# Patient Record
Sex: MALE | URBAN - METROPOLITAN AREA
[De-identification: names, ages, dates, MRNs, and addresses within clinical notes are randomized per-mention and may not be internally consistent; named-entity substitution may affect disease eponyms.]

---

## 2017-05-19 ENCOUNTER — INPATIENT (INPATIENT)
Facility: HOSPITAL | Age: 32
LOS: 1 days | Discharge: HOME | DRG: 683 | End: 2017-05-21
Attending: HOSPITALIST | Admitting: HOSPITALIST
Payer: MEDICAID

## 2017-05-19 VITALS
WEIGHT: 300.05 LBS | DIASTOLIC BLOOD PRESSURE: 56 MMHG | TEMPERATURE: 99 F | SYSTOLIC BLOOD PRESSURE: 119 MMHG | HEIGHT: 69 IN | HEART RATE: 130 BPM | RESPIRATION RATE: 28 BRPM

## 2017-05-19 DIAGNOSIS — R07.9 CHEST PAIN, UNSPECIFIED: ICD-10-CM

## 2017-05-19 DIAGNOSIS — Z87.2 PERSONAL HISTORY OF DISEASES OF THE SKIN AND SUBCUTANEOUS TISSUE: Chronic | ICD-10-CM

## 2017-05-19 DIAGNOSIS — Z98.890 OTHER SPECIFIED POSTPROCEDURAL STATES: Chronic | ICD-10-CM

## 2017-05-19 DIAGNOSIS — M79.7 FIBROMYALGIA: ICD-10-CM

## 2017-05-19 DIAGNOSIS — Z90.49 ACQUIRED ABSENCE OF OTHER SPECIFIED PARTS OF DIGESTIVE TRACT: Chronic | ICD-10-CM

## 2017-05-19 DIAGNOSIS — I10 ESSENTIAL (PRIMARY) HYPERTENSION: ICD-10-CM

## 2017-05-19 DIAGNOSIS — J45.909 UNSPECIFIED ASTHMA, UNCOMPLICATED: ICD-10-CM

## 2017-05-19 DIAGNOSIS — N17.9 ACUTE KIDNEY FAILURE, UNSPECIFIED: ICD-10-CM

## 2017-05-19 LAB
ALBUMIN SERPL ELPH-MCNC: 5.3 G/DL — HIGH (ref 3.3–5)
ALP SERPL-CCNC: 86 U/L — SIGNIFICANT CHANGE UP (ref 30–120)
ALT FLD-CCNC: 55 U/L DA — SIGNIFICANT CHANGE UP (ref 10–60)
ANION GAP SERPL CALC-SCNC: 16 MMOL/L — SIGNIFICANT CHANGE UP (ref 5–17)
APPEARANCE UR: CLEAR — SIGNIFICANT CHANGE UP
AST SERPL-CCNC: 33 U/L — SIGNIFICANT CHANGE UP (ref 10–40)
BASOPHILS # BLD AUTO: 0.1 K/UL — SIGNIFICANT CHANGE UP (ref 0–0.2)
BASOPHILS NFR BLD AUTO: 0.5 % — SIGNIFICANT CHANGE UP (ref 0–2)
BILIRUB SERPL-MCNC: 1 MG/DL — SIGNIFICANT CHANGE UP (ref 0.2–1.2)
BILIRUB UR-MCNC: NEGATIVE — SIGNIFICANT CHANGE UP
BUN SERPL-MCNC: 35 MG/DL — HIGH (ref 7–23)
CALCIUM SERPL-MCNC: 10.9 MG/DL — HIGH (ref 8.4–10.5)
CHLORIDE SERPL-SCNC: 91 MMOL/L — LOW (ref 96–108)
CK MB BLD-MCNC: 0.3 % — SIGNIFICANT CHANGE UP (ref 0–3.5)
CK MB CFR SERPL CALC: 2 NG/ML — SIGNIFICANT CHANGE UP (ref 0–3.6)
CK SERPL-CCNC: 607 U/L — HIGH (ref 39–308)
CO2 SERPL-SCNC: 25 MMOL/L — SIGNIFICANT CHANGE UP (ref 22–31)
COLOR SPEC: YELLOW — SIGNIFICANT CHANGE UP
CREAT SERPL-MCNC: 2.56 MG/DL — HIGH (ref 0.5–1.3)
D DIMER BLD IA.RAPID-MCNC: 205 NG/ML DDU — SIGNIFICANT CHANGE UP
DIFF PNL FLD: ABNORMAL
EOSINOPHIL # BLD AUTO: 0.3 K/UL — SIGNIFICANT CHANGE UP (ref 0–0.5)
EOSINOPHIL NFR BLD AUTO: 1.5 % — SIGNIFICANT CHANGE UP (ref 0–6)
GLUCOSE SERPL-MCNC: 132 MG/DL — HIGH (ref 70–99)
GLUCOSE UR QL: NEGATIVE MG/DL — SIGNIFICANT CHANGE UP
HCT VFR BLD CALC: 54.9 % — HIGH (ref 39–50)
HGB BLD-MCNC: 18.8 G/DL — HIGH (ref 13–17)
KETONES UR-MCNC: ABNORMAL
LACTATE SERPL-SCNC: 1.1 MMOL/L — SIGNIFICANT CHANGE UP (ref 0.7–2)
LACTATE SERPL-SCNC: 3.1 MMOL/L — HIGH (ref 0.7–2)
LEUKOCYTE ESTERASE UR-ACNC: NEGATIVE — SIGNIFICANT CHANGE UP
LIDOCAIN IGE QN: 273 U/L — SIGNIFICANT CHANGE UP (ref 73–393)
LYMPHOCYTES # BLD AUTO: 17.9 % — SIGNIFICANT CHANGE UP (ref 13–44)
LYMPHOCYTES # BLD AUTO: 3.2 K/UL — SIGNIFICANT CHANGE UP (ref 1–3.3)
MCHC RBC-ENTMCNC: 28.2 PG — SIGNIFICANT CHANGE UP (ref 27–34)
MCHC RBC-ENTMCNC: 34.3 GM/DL — SIGNIFICANT CHANGE UP (ref 32–36)
MCV RBC AUTO: 82.2 FL — SIGNIFICANT CHANGE UP (ref 80–100)
MONOCYTES # BLD AUTO: 1 K/UL — HIGH (ref 0–0.9)
MONOCYTES NFR BLD AUTO: 5.8 % — SIGNIFICANT CHANGE UP (ref 2–14)
NEUTROPHILS # BLD AUTO: 13.1 K/UL — HIGH (ref 1.8–7.4)
NEUTROPHILS NFR BLD AUTO: 74.2 % — SIGNIFICANT CHANGE UP (ref 43–77)
NITRITE UR-MCNC: NEGATIVE — SIGNIFICANT CHANGE UP
PCP SPEC-MCNC: SIGNIFICANT CHANGE UP
PH UR: 5 — SIGNIFICANT CHANGE UP (ref 5–8)
PLATELET # BLD AUTO: 547 K/UL — HIGH (ref 150–400)
POTASSIUM SERPL-MCNC: 3.4 MMOL/L — LOW (ref 3.5–5.3)
POTASSIUM SERPL-SCNC: 3.4 MMOL/L — LOW (ref 3.5–5.3)
PROT SERPL-MCNC: 9.6 G/DL — HIGH (ref 6–8.3)
PROT UR-MCNC: 30 MG/DL
RBC # BLD: 6.68 M/UL — HIGH (ref 4.2–5.8)
RBC # FLD: 13.4 % — SIGNIFICANT CHANGE UP (ref 10.3–14.5)
SODIUM SERPL-SCNC: 132 MMOL/L — LOW (ref 135–145)
SP GR SPEC: 1.01 — SIGNIFICANT CHANGE UP (ref 1.01–1.02)
TROPONIN I SERPL-MCNC: 0 NG/ML — LOW (ref 0.02–0.06)
UROBILINOGEN FLD QL: NEGATIVE MG/DL — SIGNIFICANT CHANGE UP
WBC # BLD: 17.6 K/UL — HIGH (ref 3.8–10.5)
WBC # FLD AUTO: 17.6 K/UL — HIGH (ref 3.8–10.5)

## 2017-05-19 PROCEDURE — 99223 1ST HOSP IP/OBS HIGH 75: CPT | Mod: AI

## 2017-05-19 PROCEDURE — 74176 CT ABD & PELVIS W/O CONTRAST: CPT | Mod: 26

## 2017-05-19 PROCEDURE — 99285 EMERGENCY DEPT VISIT HI MDM: CPT

## 2017-05-19 PROCEDURE — 76775 US EXAM ABDO BACK WALL LIM: CPT | Mod: 26

## 2017-05-19 PROCEDURE — 93010 ELECTROCARDIOGRAM REPORT: CPT | Mod: 77

## 2017-05-19 PROCEDURE — 71010: CPT | Mod: 26

## 2017-05-19 PROCEDURE — 93010 ELECTROCARDIOGRAM REPORT: CPT

## 2017-05-19 RX ORDER — HEPARIN SODIUM 5000 [USP'U]/ML
5000 INJECTION INTRAVENOUS; SUBCUTANEOUS EVERY 8 HOURS
Qty: 0 | Refills: 0 | Status: DISCONTINUED | OUTPATIENT
Start: 2017-05-19 | End: 2017-05-21

## 2017-05-19 RX ORDER — CLONAZEPAM 1 MG
1 TABLET ORAL
Qty: 0 | Refills: 0 | Status: DISCONTINUED | OUTPATIENT
Start: 2017-05-19 | End: 2017-05-21

## 2017-05-19 RX ORDER — PIPERACILLIN AND TAZOBACTAM 4; .5 G/20ML; G/20ML
3.38 INJECTION, POWDER, LYOPHILIZED, FOR SOLUTION INTRAVENOUS ONCE
Qty: 0 | Refills: 0 | Status: COMPLETED | OUTPATIENT
Start: 2017-05-19 | End: 2017-05-19

## 2017-05-19 RX ORDER — ARIPIPRAZOLE 15 MG/1
10 TABLET ORAL DAILY
Qty: 0 | Refills: 0 | Status: DISCONTINUED | OUTPATIENT
Start: 2017-05-19 | End: 2017-05-21

## 2017-05-19 RX ORDER — PANTOPRAZOLE SODIUM 20 MG/1
40 TABLET, DELAYED RELEASE ORAL ONCE
Qty: 0 | Refills: 0 | Status: COMPLETED | OUTPATIENT
Start: 2017-05-19 | End: 2017-05-19

## 2017-05-19 RX ORDER — DULOXETINE HYDROCHLORIDE 30 MG/1
60 CAPSULE, DELAYED RELEASE ORAL DAILY
Qty: 0 | Refills: 0 | Status: DISCONTINUED | OUTPATIENT
Start: 2017-05-19 | End: 2017-05-21

## 2017-05-19 RX ORDER — SODIUM CHLORIDE 9 MG/ML
1000 INJECTION INTRAMUSCULAR; INTRAVENOUS; SUBCUTANEOUS
Qty: 0 | Refills: 0 | Status: DISCONTINUED | OUTPATIENT
Start: 2017-05-19 | End: 2017-05-20

## 2017-05-19 RX ORDER — FOLIC ACID 0.8 MG
1 TABLET ORAL DAILY
Qty: 0 | Refills: 0 | Status: DISCONTINUED | OUTPATIENT
Start: 2017-05-19 | End: 2017-05-21

## 2017-05-19 RX ORDER — SODIUM CHLORIDE 9 MG/ML
1000 INJECTION INTRAMUSCULAR; INTRAVENOUS; SUBCUTANEOUS ONCE
Qty: 0 | Refills: 0 | Status: COMPLETED | OUTPATIENT
Start: 2017-05-19 | End: 2017-05-19

## 2017-05-19 RX ORDER — LEVALBUTEROL 1.25 MG/.5ML
0.63 SOLUTION, CONCENTRATE RESPIRATORY (INHALATION) EVERY 4 HOURS
Qty: 0 | Refills: 0 | Status: DISCONTINUED | OUTPATIENT
Start: 2017-05-19 | End: 2017-05-21

## 2017-05-19 RX ORDER — METOPROLOL TARTRATE 50 MG
5 TABLET ORAL ONCE
Qty: 0 | Refills: 0 | Status: DISCONTINUED | OUTPATIENT
Start: 2017-05-19 | End: 2017-05-19

## 2017-05-19 RX ORDER — SODIUM CHLORIDE 9 MG/ML
3 INJECTION INTRAMUSCULAR; INTRAVENOUS; SUBCUTANEOUS ONCE
Qty: 0 | Refills: 0 | Status: COMPLETED | OUTPATIENT
Start: 2017-05-19 | End: 2017-05-19

## 2017-05-19 RX ORDER — HYDROMORPHONE HYDROCHLORIDE 2 MG/ML
1 INJECTION INTRAMUSCULAR; INTRAVENOUS; SUBCUTANEOUS ONCE
Qty: 0 | Refills: 0 | Status: DISCONTINUED | OUTPATIENT
Start: 2017-05-19 | End: 2017-05-19

## 2017-05-19 RX ADMIN — PANTOPRAZOLE SODIUM 40 MILLIGRAM(S): 20 TABLET, DELAYED RELEASE ORAL at 16:57

## 2017-05-19 RX ADMIN — PIPERACILLIN AND TAZOBACTAM 200 GRAM(S): 4; .5 INJECTION, POWDER, LYOPHILIZED, FOR SOLUTION INTRAVENOUS at 19:25

## 2017-05-19 RX ADMIN — HYDROMORPHONE HYDROCHLORIDE 1 MILLIGRAM(S): 2 INJECTION INTRAMUSCULAR; INTRAVENOUS; SUBCUTANEOUS at 20:00

## 2017-05-19 RX ADMIN — HYDROMORPHONE HYDROCHLORIDE 1 MILLIGRAM(S): 2 INJECTION INTRAMUSCULAR; INTRAVENOUS; SUBCUTANEOUS at 19:25

## 2017-05-19 RX ADMIN — SODIUM CHLORIDE 2000 MILLILITER(S): 9 INJECTION INTRAMUSCULAR; INTRAVENOUS; SUBCUTANEOUS at 16:58

## 2017-05-19 RX ADMIN — SODIUM CHLORIDE 3 MILLILITER(S): 9 INJECTION INTRAMUSCULAR; INTRAVENOUS; SUBCUTANEOUS at 16:27

## 2017-05-19 RX ADMIN — Medication 1 MILLIGRAM(S): at 19:26

## 2017-05-19 RX ADMIN — SODIUM CHLORIDE 2000 MILLILITER(S): 9 INJECTION INTRAMUSCULAR; INTRAVENOUS; SUBCUTANEOUS at 16:28

## 2017-05-19 RX ADMIN — SODIUM CHLORIDE 100 MILLILITER(S): 9 INJECTION INTRAMUSCULAR; INTRAVENOUS; SUBCUTANEOUS at 22:54

## 2017-05-19 RX ADMIN — Medication 2 MILLIGRAM(S): at 15:51

## 2017-05-19 NOTE — ED ADULT NURSE NOTE - NS ED NURSE LEVEL OF CONSCIOUSNESS ORIENTATION
Patient Demographics     Address Phone E-mail Address    [address] [phone] (Home) *Preferred* [email or web address]      Emergency Contact(s)     Name Relation Home Work [address] [facility] Spouse 21  [place], [facility] Oriented - self; Oriented - place; Oriented - time

## 2017-05-19 NOTE — H&P ADULT - PMH
Depression    Essential hypertension    Fibromyalgia    Irritable bowel syndrome, unspecified type    Uncomplicated asthma, unspecified asthma severity

## 2017-05-19 NOTE — H&P ADULT - HISTORY OF PRESENT ILLNESS
32M with fibromyalgia, HTN, depression, asthma, IBS who presents with diffuse muscle spasms.  Patient states his spams feels similar to his fibromyalgia but much worse.  He has been experiencing spasms throughout his body for the past three days.  In addition, he started to experience chest pain, right-side worse than his left.  Pain is described as intermittent but each episode has been lasting longer and longer.  Also associated with +SOB, +cough, +nausea/vomiting (no blood), +diarrhea (no blood), +decreased PO intake, +HA/dizziness (for the past 2 days).  Patient denies any recent travel or sick contacts.  No recent medication changes except he has been taking Prilosec instead of his other GI medication.  Also has been taking his inhaler (xopenex) more frequently because of his SOB.  Also smoked marijuana about 4 days ago though he said the marijuana was from the same batch he has smoked before with no adverse reactions.      In the ED, patient was noted to be diaphoretic with diffuse muscle spams/tightening.  Also noted to be tachycardic and was eventually given lopressor IV.  Labs were notable for a lactic acid of 3.1, YURY 2.56, WBC 17.6, hgb 18.8, and .  D-dimer was negative.  EKG showed sinus tach with incomplete RBBB, RAD, poor R wave progression.  CT A/P showed no acute processes.  Currently, patient feels better with no muscle spams/pain.  HR improved to 100-110s. 32M with fibromyalgia, HTN, depression, asthma, IBS who presents with diffuse muscle spasms.  Patient states his spams feels similar to his fibromyalgia but much worse.  He has been experiencing spasms throughout his body for the past three days.  In addition, he started to experience chest pain, right-side worse than his left.  Pain is described as intermittent but each episode has been lasting longer and longer.  Also associated with +SOB, +cough, +nausea/vomiting (no blood), +diarrhea (no blood), +decreased PO intake, +HA/dizziness (for the past 2 days).  Admits to some recent weight loss as well.  Patient denies any recent travel or sick contacts.  No recent medication changes except he has been taking Prilosec instead of his other GI medication.  Also has been taking his inhaler (xopenex) more frequently because of his SOB.  Also smoked marijuana about 4 days ago though he said the marijuana was from the same batch he has smoked before with no adverse reactions.      In the ED, patient was noted to be diaphoretic with diffuse muscle spams/tightening.  Also noted to be tachycardic and was eventually given lopressor IV.  Labs were notable for a lactic acid of 3.1, YURY 2.56, WBC 17.6, hgb 18.8, and .  D-dimer was negative.  EKG showed sinus tach with incomplete RBBB, RAD, poor R wave progression.  CT A/P showed no acute processes.  Currently, patient feels better with no muscle spams/pain.  HR improved to 100-110s.

## 2017-05-19 NOTE — H&P ADULT - NSHPPHYSICALEXAM_GEN_ALL_CORE
PHYSICAL EXAM:  Vital Signs Last 24 Hrs  T(C): 36.6, Max: 37 (05-19 @ 15:27)  T(F): 97.9, Max: 98.6 (05-19 @ 15:27)  HR: 113 (113 - 130)  BP: 121/73 (113/56 - 131/79)  BP(mean): --  RR: 18 (18 - 28)  SpO2: 99% (98% - 99%)    GENERAL: obese male NAD, well-groomed, well-developed  HEAD:  atraumatic, normocephalic  EYES: EOMI, conjunctiva and sclera clear  ENMT: No tonsillar erythema, exudates, or enlargement; dry mucous membranes, good dentition, No lesions  NECK: supple, no JVD  NERVOUS SYSTEM:  alert & oriented X3, good concentration; motor Strength 5/5 B/L upper and lower extremities  CHEST/LUNG: clear to auscultation bilaterally; No rales, rhonchi, wheezing, or rubs  HEART: tachycardic, soft s1 and s2; no murmurs, rubs, or gallops  ABDOMEN: soft, slight tender diffusely, protuberant but nondistended; bowel sounds present  EXTREMITIES:  2+ peripheral pulses, no clubbing, cyanosis, or edema

## 2017-05-19 NOTE — H&P ADULT - NSHPSOCIALHISTORY_GEN_ALL_CORE
- denies smoking or etoh use  + smokes - denies smoking or etoh use  + smokes marijuana for his fibromyalgia but denies any other illegal drug use

## 2017-05-19 NOTE — ED PROVIDER NOTE - NS ED MD SCRIBE ATTENDING SCRIBE SECTIONS
PHYSICAL EXAM/PAST MEDICAL/SURGICAL/SOCIAL HISTORY/VITAL SIGNS( Pullset)/HIV/HISTORY OF PRESENT ILLNESS/REVIEW OF SYSTEMS/DISPOSITION

## 2017-05-19 NOTE — ED PROVIDER NOTE - OBJECTIVE STATEMENT
33 y/o M pt with PMHx of fibromyalgia, muscle spasm, depression, hypertension, and IBS presents to the ED c/o chest pain, onset 3 days ago. Pt describes the pain as muscles spasms in his chest. As per father, his pain has been worsening and is constant. Pt reports he smokes marijuana, but the last time was 3 days ago. Also c/o dizziness, lightheadedness, diaphoresis, cough and emesis, secondary to cough, onset yesterday. Denies black stool or fever. No other complaints at this time.  PCP: Dr. Menezes

## 2017-05-19 NOTE — H&P ADULT - NSHPLABSRESULTS_GEN_ALL_CORE
LABS:  CBC Full  -  ( 19 May 2017 16:31 )  WBC Count : 17.6 K/uL  Hemoglobin : 18.8 g/dL  Hematocrit : 54.9 %  Platelet Count - Automated : 547 K/uL  Mean Cell Volume : 82.2 fl  Mean Cell Hemoglobin : 28.2 pg  Mean Cell Hemoglobin Concentration : 34.3 gm/dL  Auto Neutrophil # : 13.1 K/uL  Auto Lymphocyte # : 3.2 K/uL  Auto Monocyte # : 1.0 K/uL  Auto Eosinophil # : 0.3 K/uL  Auto Basophil # : 0.1 K/uL  Auto Neutrophil % : 74.2 %  Auto Lymphocyte % : 17.9 %  Auto Monocyte % : 05.8 %  Auto Eosinophil % : 1.5 %  Auto Basophil % : 0.5 %    19 May 2017 16:31    132<L>  |  91<L>  |  35<H>  ----------------------------<  132<H>  3.4<L>   |  25     |  2.56<H>        Ca    10.9<H>      19 May 2017 16:31    TPro  9.6<H>  /  Alb  5.3<H>  /  TBili  1.0    /  DBili  x      /  AST  33     /  ALT  55     /  AlkPhos  86     19 May 2017 16:31      Urinalysis Basic - ( 19 May 2017 18:59 )    Color: Yellow / Appearance: Clear / S.010 / pH: x  Gluc: x / Ketone: Trace  / Bili: Negative / Urobili: Negative mg/dL   Blood: x / Protein: 30 mg/dL / Nitrite: Negative   Leuk Esterase: Negative / RBC: 3-5 /HPF / WBC 0-2   Sq Epi: x / Non Sq Epi: Occasional / Bacteria: Few        Troponin trend:  .002  -19 @ 16:31 LABS:  CBC Full  -  ( 19 May 2017 16:31 )  WBC Count : 17.6 K/uL  Hemoglobin : 18.8 g/dL  Hematocrit : 54.9 %  Platelet Count - Automated : 547 K/uL  Mean Cell Volume : 82.2 fl  Mean Cell Hemoglobin : 28.2 pg  Mean Cell Hemoglobin Concentration : 34.3 gm/dL  Auto Neutrophil # : 13.1 K/uL  Auto Lymphocyte # : 3.2 K/uL  Auto Monocyte # : 1.0 K/uL  Auto Eosinophil # : 0.3 K/uL  Auto Basophil # : 0.1 K/uL  Auto Neutrophil % : 74.2 %  Auto Lymphocyte % : 17.9 %  Auto Monocyte % : 05.8 %  Auto Eosinophil % : 1.5 %  Auto Basophil % : 0.5 %    19 May 2017 16:31    132<L>  |  91<L>  |  35<H>  ----------------------------<  132<H>  3.4<L>   |  25     |  2.56<H>        Ca    10.9<H>      19 May 2017 16:31    TPro  9.6<H>  /  Alb  5.3<H>  /  TBili  1.0    /  DBili  x      /  AST  33     /  ALT  55     /  AlkPhos  86     19 May 2017 16:31      Urinalysis Basic - ( 19 May 2017 18:59 )    Color: Yellow / Appearance: Clear / S.010 / pH: x  Gluc: x / Ketone: Trace  / Bili: Negative / Urobili: Negative mg/dL   Blood: x / Protein: 30 mg/dL / Nitrite: Negative   Leuk Esterase: Negative / RBC: 3-5 /HPF / WBC 0-2   Sq Epi: x / Non Sq Epi: Occasional / Bacteria: Few    Troponin trend:  .002  -19 @ 16:31    EKG (see above)    CT A/P:   FINDINGS:   The lung bases are clear. The heart is not enlarged.    Evaluation of the parenchymal organs and vascular structures is limited   without intravenous contrast. Suboptimal assessment of the bowel without   oral contrast.    The liver, spleen, pancreas, gallbladder and adrenal glands are grossly   unremarkable.    There is no hydronephrosis or perinephric stranding. There is no   obstructing renal calculus.    There is no bowel obstruction, free air or free fluid. There is no   abnormal bowel wall thickening. The appendix is not visualized, likely   previously surgically removed.    The abdominal aorta is normal in caliber. There is no retroperitoneal,   pelvic or inguinal adenopathy.     Theurinary bladder is unremarkable without bladder calculus. The   prostate gland is not enlarged.    There is an osseous meningioma within the T8 vertebral body. There are no   acute osseous abnormalities.    IMPRESSION:   No evidence of infectious source within the abdomen or pelvis allowing   for absence of contrast.

## 2017-05-19 NOTE — H&P ADULT - PROBLEM SELECTOR PLAN 4
- cont abilify and buspar and cymbalta (monitor QTc)  - consider psych consult if no organic causes are found  - cont klonopin

## 2017-05-19 NOTE — ED PROVIDER NOTE - MEDICAL DECISION MAKING DETAILS
Pt is a 33 yo male hx fibromylagia, depression.  pt also with panic attacks on benzos. pt states 3 days ago began with cp, sob, initially spasms, then n/v, no diffuse spasms, diaphoresis.  pain severe. pt tachy.  check cardiac labs, dd, ivf, pain meds, check tox,  check sepsis eval.  reevaulate.

## 2017-05-19 NOTE — H&P ADULT - ASSESSMENT
32M with fibromyalgia, HTN, depression, asthma, IBS who presents with diffuse muscle spasms.  Unclear cause of his symptoms.  Differentials include adverse effect from drug use (marijuana could have been laced with something), psychiatric (i.e. anxiety, panic attacks, somatization, factitious d/o), adverse effect from his medications (catalino his psych meds that can cause EPS/tardive dyskinesia or withdrawal from his klonopin though he takes it regularly), organic causes (i.e. hyperthyroidism, electrolyte imbalances, occult infection.).  Also concerning is that he has YURY.  Unknown baseline but patient said he had blood work from Dr. Menezes a couple of weeks ago with no known issues.  YURY could be caused from dehydration (poor PO intake) or, again, adverse effect from drugs/medications.

## 2017-05-19 NOTE — H&P ADULT - PROBLEM SELECTOR PLAN 1
- admit to telemetry  - trend troponin  - repeat EKG  - lopressor IV PRN tachycardia  - muscle spams resolved for now but can give benadryl to see if it would resolve symptoms (if he has EPS-like symptoms)  - f/u utox  - check TSH  - f/u blood cultures  - hold off abx for now as there is no identifiable source currently

## 2017-05-20 LAB
ALBUMIN SERPL ELPH-MCNC: 3.6 G/DL — SIGNIFICANT CHANGE UP (ref 3.3–5)
ALP SERPL-CCNC: 56 U/L — SIGNIFICANT CHANGE UP (ref 30–120)
ALT FLD-CCNC: 40 U/L DA — SIGNIFICANT CHANGE UP (ref 10–60)
ANION GAP SERPL CALC-SCNC: 9 MMOL/L — SIGNIFICANT CHANGE UP (ref 5–17)
APPEARANCE UR: CLEAR — SIGNIFICANT CHANGE UP
AST SERPL-CCNC: 34 U/L — SIGNIFICANT CHANGE UP (ref 10–40)
BASOPHILS # BLD AUTO: 0.1 K/UL — SIGNIFICANT CHANGE UP (ref 0–0.2)
BASOPHILS NFR BLD AUTO: 1 % — SIGNIFICANT CHANGE UP (ref 0–2)
BILIRUB SERPL-MCNC: 0.7 MG/DL — SIGNIFICANT CHANGE UP (ref 0.2–1.2)
BILIRUB UR-MCNC: NEGATIVE — SIGNIFICANT CHANGE UP
BUN SERPL-MCNC: 26 MG/DL — HIGH (ref 7–23)
CALCIUM SERPL-MCNC: 8.7 MG/DL — SIGNIFICANT CHANGE UP (ref 8.4–10.5)
CHLORIDE SERPL-SCNC: 100 MMOL/L — SIGNIFICANT CHANGE UP (ref 96–108)
CO2 SERPL-SCNC: 26 MMOL/L — SIGNIFICANT CHANGE UP (ref 22–31)
COLOR SPEC: YELLOW — SIGNIFICANT CHANGE UP
CREAT SERPL-MCNC: 1.46 MG/DL — HIGH (ref 0.5–1.3)
CULTURE RESULTS: NO GROWTH — SIGNIFICANT CHANGE UP
DIFF PNL FLD: NEGATIVE — SIGNIFICANT CHANGE UP
EOSINOPHIL # BLD AUTO: 0.3 K/UL — SIGNIFICANT CHANGE UP (ref 0–0.5)
EOSINOPHIL NFR BLD AUTO: 2.8 % — SIGNIFICANT CHANGE UP (ref 0–6)
GLUCOSE SERPL-MCNC: 93 MG/DL — SIGNIFICANT CHANGE UP (ref 70–99)
GLUCOSE UR QL: NEGATIVE MG/DL — SIGNIFICANT CHANGE UP
HCT VFR BLD CALC: 42.5 % — SIGNIFICANT CHANGE UP (ref 39–50)
HGB BLD-MCNC: 14.7 G/DL — SIGNIFICANT CHANGE UP (ref 13–17)
KETONES UR-MCNC: NEGATIVE — SIGNIFICANT CHANGE UP
LEUKOCYTE ESTERASE UR-ACNC: NEGATIVE — SIGNIFICANT CHANGE UP
LYMPHOCYTES # BLD AUTO: 3 K/UL — SIGNIFICANT CHANGE UP (ref 1–3.3)
LYMPHOCYTES # BLD AUTO: 30.2 % — SIGNIFICANT CHANGE UP (ref 13–44)
MAGNESIUM SERPL-MCNC: 2.2 MG/DL — SIGNIFICANT CHANGE UP (ref 1.6–2.6)
MCHC RBC-ENTMCNC: 28.3 PG — SIGNIFICANT CHANGE UP (ref 27–34)
MCHC RBC-ENTMCNC: 34.5 GM/DL — SIGNIFICANT CHANGE UP (ref 32–36)
MCV RBC AUTO: 82 FL — SIGNIFICANT CHANGE UP (ref 80–100)
MONOCYTES # BLD AUTO: 0.9 K/UL — SIGNIFICANT CHANGE UP (ref 0–0.9)
MONOCYTES NFR BLD AUTO: 8.7 % — SIGNIFICANT CHANGE UP (ref 2–14)
NEUTROPHILS # BLD AUTO: 5.7 K/UL — SIGNIFICANT CHANGE UP (ref 1.8–7.4)
NEUTROPHILS NFR BLD AUTO: 57.3 % — SIGNIFICANT CHANGE UP (ref 43–77)
NITRITE UR-MCNC: NEGATIVE — SIGNIFICANT CHANGE UP
PH UR: 5 — SIGNIFICANT CHANGE UP (ref 5–8)
PHOSPHATE SERPL-MCNC: 3.2 MG/DL — SIGNIFICANT CHANGE UP (ref 2.5–4.5)
PLATELET # BLD AUTO: 342 K/UL — SIGNIFICANT CHANGE UP (ref 150–400)
POTASSIUM SERPL-MCNC: 3.5 MMOL/L — SIGNIFICANT CHANGE UP (ref 3.5–5.3)
POTASSIUM SERPL-SCNC: 3.5 MMOL/L — SIGNIFICANT CHANGE UP (ref 3.5–5.3)
PROT SERPL-MCNC: 6.8 G/DL — SIGNIFICANT CHANGE UP (ref 6–8.3)
PROT UR-MCNC: 15 MG/DL
RBC # BLD: 5.18 M/UL — SIGNIFICANT CHANGE UP (ref 4.2–5.8)
RBC # FLD: 13.6 % — SIGNIFICANT CHANGE UP (ref 10.3–14.5)
RBC CASTS # UR COMP ASSIST: ABNORMAL /HPF (ref 0–4)
SODIUM SERPL-SCNC: 135 MMOL/L — SIGNIFICANT CHANGE UP (ref 135–145)
SP GR SPEC: 1.02 — SIGNIFICANT CHANGE UP (ref 1.01–1.02)
SPECIMEN SOURCE: SIGNIFICANT CHANGE UP
TROPONIN I SERPL-MCNC: 0 NG/ML — LOW (ref 0.02–0.06)
TSH SERPL-MCNC: 0.94 UIU/ML — SIGNIFICANT CHANGE UP (ref 0.27–4.2)
UROBILINOGEN FLD QL: NEGATIVE MG/DL — SIGNIFICANT CHANGE UP
WBC # BLD: 9.9 K/UL — SIGNIFICANT CHANGE UP (ref 3.8–10.5)
WBC # FLD AUTO: 9.9 K/UL — SIGNIFICANT CHANGE UP (ref 3.8–10.5)
WBC UR QL: SIGNIFICANT CHANGE UP

## 2017-05-20 PROCEDURE — 99233 SBSQ HOSP IP/OBS HIGH 50: CPT

## 2017-05-20 RX ORDER — DIAZEPAM 5 MG
2 TABLET ORAL ONCE
Qty: 0 | Refills: 0 | Status: DISCONTINUED | OUTPATIENT
Start: 2017-05-20 | End: 2017-05-20

## 2017-05-20 RX ORDER — POTASSIUM CHLORIDE 20 MEQ
20 PACKET (EA) ORAL ONCE
Qty: 0 | Refills: 0 | Status: COMPLETED | OUTPATIENT
Start: 2017-05-20 | End: 2017-05-20

## 2017-05-20 RX ORDER — SODIUM CHLORIDE 9 MG/ML
1000 INJECTION INTRAMUSCULAR; INTRAVENOUS; SUBCUTANEOUS
Qty: 0 | Refills: 0 | Status: DISCONTINUED | OUTPATIENT
Start: 2017-05-20 | End: 2017-05-21

## 2017-05-20 RX ADMIN — HEPARIN SODIUM 5000 UNIT(S): 5000 INJECTION INTRAVENOUS; SUBCUTANEOUS at 14:13

## 2017-05-20 RX ADMIN — Medication 1 MILLIGRAM(S): at 11:24

## 2017-05-20 RX ADMIN — HEPARIN SODIUM 5000 UNIT(S): 5000 INJECTION INTRAVENOUS; SUBCUTANEOUS at 22:17

## 2017-05-20 RX ADMIN — HEPARIN SODIUM 5000 UNIT(S): 5000 INJECTION INTRAVENOUS; SUBCUTANEOUS at 06:23

## 2017-05-20 RX ADMIN — Medication 1 MILLIGRAM(S): at 22:17

## 2017-05-20 RX ADMIN — Medication 1 MILLIGRAM(S): at 12:30

## 2017-05-20 RX ADMIN — ARIPIPRAZOLE 10 MILLIGRAM(S): 15 TABLET ORAL at 12:30

## 2017-05-20 RX ADMIN — DULOXETINE HYDROCHLORIDE 60 MILLIGRAM(S): 30 CAPSULE, DELAYED RELEASE ORAL at 12:30

## 2017-05-20 RX ADMIN — Medication 20 MILLIEQUIVALENT(S): at 05:40

## 2017-05-20 RX ADMIN — Medication 15 MILLIGRAM(S): at 22:17

## 2017-05-20 RX ADMIN — Medication 2 MILLIGRAM(S): at 05:40

## 2017-05-20 RX ADMIN — Medication 15 MILLIGRAM(S): at 06:23

## 2017-05-20 RX ADMIN — Medication 15 MILLIGRAM(S): at 14:12

## 2017-05-20 NOTE — CONSULT NOTE ADULT - SUBJECTIVE AND OBJECTIVE BOX
Pt is 32M with fibromyalgia, HTN, depression, asthma, IBS who presented with diffuse muscle pains. Smoked marijuana about 4 days ago though he said the marijuana was from the same batch he has smoked before with no adverse reactions.    Patient was noted to be diaphoretic in ED with diffuse muscle spams/tightening.  Also noted to be tachycardic and was eventually given lopressor IV.  Labs were notable for a lactic acid of 3.1, YURY w/Cr 2.56, WBC 17.6, hgb 18.8, and .  D-dimer was negative.      Today feels much better, denies CP, SOB, N/V, D/C, F/C, dysuria. Was on ACE, HCTZ, Naproxen and Aleve as OP.    Review of Systems:  as above	  	      Allergies and Intolerances:        Allergies:  	albuterol: Drug, Other, rapid heart rate    Meds:  heparin  Injectable 5000Unit(s) SubCutaneous every 8 hours  clonazePAM Tablet 1milliGRAM(s) Oral two times a day  DULoxetine 60milliGRAM(s) Oral daily  ARIPiprazole 10milliGRAM(s) Oral daily  busPIRone 15milliGRAM(s) Oral three times a day  folic acid 1milliGRAM(s) Oral daily  levalbuterol Inhalation 0.63milliGRAM(s) Inhalation every 4 hours PRN  sodium chloride 0.9%. 1000milliLiter(s) IV Continuous <Continuous>      Patient History:   Past Medical History:  Depression    Essential hypertension    Fibromyalgia    Irritable bowel syndrome, unspecified type    Uncomplicated asthma, unspecified asthma severity.    Past Surgical History:  H/O pilonidal cyst    History of appendectomy    History of bilateral inguinal herniorrhaphies.    Family History:  Father  Family history of heart disease     Social History:  Social Historyn - smokes marijuana for his fibromyalgia per [facility] pt but denies any other illegal drug use, denies Tob or ETOH	      		      Physical Exam:   Vital Signs Last 24 Hrs T(C): 36.5, Max: 36.6 (05-19 @ 21:25) T(F): 97.7, Max: 97.9 (05-19 @ 21:25) HR: 100 (100 - 113) BP: 142/84 (121/73 - 142/84) BP(mean): -- RR: 18 (18 - 18) SpO2: 96% (96% - 99%)  GENERAL: obese male NAD HEAD:  atraumatic, normocephalic EYES: EOMI, conjunctiva and sclera clear NECK: supple, no JVD NERVOUS SYSTEM:  alert & oriented X3 CHEST/LUNG: clear to auscultation bilaterally; No rales, rhonchi, wheezing, or rubs HEART: s1 and s2; no murmurs, rubs, or gallops ABDOMEN: soft, nondistended; bowel sounds present EXTREMITIES: no clubbing, cyanosis, or edema	      Labs and Results:                        14.7  9.9   )-----------( 342      ( 20 May 2017 06:26 )            42.5   20 May 2017 06:26  135    |  100    |  26    ----------------------------<  93    3.5     |  26     |  1.46   Ca    8.7        20 May 2017 06:26 Phos  3.2       20 May 2017 06:26 Mg     2.2       20 May 2017 06:26  TPro  6.8    /  Alb  3.6    /  TBili  0.7    /  DBili  x      /  AST  34     /  ALT  40     /  AlkPhos  56     20 May 2017 06:26  LIVER FUNCTIONS - ( 20 May 2017 06:26 ) Alb: 3.6 g/dL / Pro: 6.8 g/dL / ALK PHOS: 56 U/L / ALT: 40 U/L DA / AST: 34 U/L / GGT: x          Urinalysis Basic - ( 19 May 2017 18:59 )  Color: Yellow / Appearance: Clear / S.010 / pH: x Gluc: x / Ketone: Trace  / Bili: Negative / Urobili: Negative mg/dL  Blood: x / Protein: 30 mg/dL / Nitrite: Negative  Leuk Esterase: Negative / RBC: 3-5 /HPF / WBC 0-2  Sq Epi: x / Non Sq Epi: Occasional / Bacteria: Few  CT A/P:  FINDINGS:  The lung bases are clear. The heart is not enlarged.  Evaluation of the parenchymal organs and vascular structures is limited  without intravenous contrast. Suboptimal assessment of the bowel without  oral contrast.  The liver, spleen, pancreas, gallbladder and adrenal glands are grossly  unremarkable.  There is no hydronephrosis or perinephric stranding. There is no  obstructing renal calculus.  There is no bowel obstruction, free air or free fluid. There is no  abnormal bowel wall thickening. The appendix is not visualized, likely  previously surgically removed.  The abdominal aorta is normal in caliber. There is no retroperitoneal,  pelvic or inguinal adenopathy.   Theurinary bladder is unremarkable without bladder calculus. The  prostate gland is not enlarged.  There is an osseous meningioma within the T8 vertebral body. There are no  acute osseous abnormalities.  IMPRESSION:  No evidence of infectious source within the abdomen or pelvis allowing  for absence of contrast.	  LABS: CBC Full  -  ( 19 May 2017 16:31 ) WBC Count : 17.6 K/uL Hemoglobin : 18.8 g/dL Hematocrit : 54.9 % Platelet Count - Automated : 547 K/uL Mean Cell Volume : 82.2 fl Mean Cell Hemoglobin : 28.2 pg Mean Cell Hemoglobin Concentration : 34.3 gm/dL Auto Neutrophil # : 13.1 K/uL Auto Lymphocyte # : 3.2 K/uL Auto Monocyte # : 1.0 K/uL Auto Eosinophil # : 0.3 K/uL Auto Basophil # : 0.1 K/uL Auto Neutrophil % : 74.2 % Auto Lymphocyte % : 17.9 % Auto Monocyte % : 05.8 % Auto Eosinophil % : 1.5 % Auto Basophil % : 0.5 %  19 May 2017 16:31  132<L>  |  91<L>  |  35<H> ----------------------------<  132<H> 3.4<L>   |  25     |  2.56<H>    Ca    10.9<H>      19 May 2017 16:31  TPro  9.6<H>  /  Alb  5.3<H>  /  TBili  1.0    /  DBili  x      /  AST  33     /  ALT  55     /  AlkPhos  86     19 May 2017 16:31   Urinalysis Basic - ( 19 May 2017 18:59 )  Color: Yellow / Appearance: Clear / S.010 / pH: x Gluc: x / Ketone: Trace  / Bili: Negative / Urobili: Negative mg/dL  Blood: x / Protein: 30 mg/dL / Nitrite: Negative  Leuk Esterase: Negative / RBC: 3-5 /HPF / WBC 0-2  Sq Epi: x / Non Sq Epi: Occasional / Bacteria: Few    Troponin trend: .002   @ 16:31	    Assessment and Plan:   Assessment:  · Assessment		  Appears to have pre-renal YURY exacerbated by ACE, HCTZ, NSAID's  Mild rhabdo  Renal function is improving w/IVF  Continue IVF today  Would avoid ACE/ARB in future  Would avoid diuretics unless signs of volume overload  D/w pt: no NSAID's  BMP in am  If renal function is better in am, no objection to d/c from renal POV w/OP f/u

## 2017-05-20 NOTE — PROGRESS NOTE ADULT - ATTENDING COMMENTS
likely discharge home tomorrow if creatinine continues to improve.  seems like a lot of his symptoms were related to perhaps viral gastroenteritis which seems to have resolved.

## 2017-05-20 NOTE — PROGRESS NOTE ADULT - SUBJECTIVE AND OBJECTIVE BOX
Patient is a 32y old  Male who presents with a chief complaint of diffuse muscle spasms/chest pain (19 May 2017 22:16)      INTERVAL HPI/OVERNIGHT EVENTS: feels somewhat better. no diarrhea or abd pain. tachcyardia worse with exertion.    MEDICATIONS  (STANDING):  heparin  Injectable 5000Unit(s) SubCutaneous every 8 hours  clonazePAM Tablet 1milliGRAM(s) Oral two times a day  DULoxetine 60milliGRAM(s) Oral daily  ARIPiprazole 10milliGRAM(s) Oral daily  busPIRone 15milliGRAM(s) Oral three times a day  folic acid 1milliGRAM(s) Oral daily  sodium chloride 0.9%. 1000milliLiter(s) IV Continuous <Continuous>    MEDICATIONS  (PRN):  levalbuterol Inhalation 0.63milliGRAM(s) Inhalation every 4 hours PRN shortness of breah      Allergies    albuterol (Other)    Intolerances        REVIEW OF SYSTEMS:  Negative unless otherwise specified above.    Vital Signs Last 24 Hrs  T(C): 36.6, Max: 37 ( @ 15:27)  T(F): 97.9, Max: 98.6 ( @ 15:27)  HR: 113 (113 - 130)  BP: 121/73 (113/56 - 131/79)  BP(mean): --  RR: 18 (18 - 28)  SpO2: 99% (98% - 99%)    Height (cm): 175.3 ( @ 15:27)  Weight (kg): 136.1 ( @ 15:27)  BMI (kg/m2): 44.3 ( 15:27)  BSA (m2): 2.45 ( @ 15:27)    PHYSICAL EXAM:  obese  lungs clear  tachycardic  abd soft non-tender  no edema    LABS:                        14.7   9.9   )-----------( 342      ( 20 May 2017 06:26 )             42.5     20 May 2017 06:26    135    |  100    |  26     ----------------------------<  93     3.5     |  26     |  1.46     Ca    8.7        20 May 2017 06:26  Phos  3.2       20 May 2017 06:26  Mg     2.2       20 May 2017 06:26    TPro  6.8    /  Alb  3.6    /  TBili  0.7    /  DBili  x      /  AST  34     /  ALT  40     /  AlkPhos  56     20 May 2017 06:26      Urinalysis Basic - ( 19 May 2017 18:59 )    Color: Yellow / Appearance: Clear / S.010 / pH: x  Gluc: x / Ketone: Trace  / Bili: Negative / Urobili: Negative mg/dL   Blood: x / Protein: 30 mg/dL / Nitrite: Negative   Leuk Esterase: Negative / RBC: 3-5 /HPF / WBC 0-2   Sq Epi: x / Non Sq Epi: Occasional / Bacteria: Few      CAPILLARY BLOOD GLUCOSE      RADIOLOGY & ADDITIONAL TESTS:      I & Os for current day (as of  @ 10:27)  =============================================  IN:    Sodium Chloride 0.9% IV Bolus: 3000 ml    Total IN: 3000 ml  ---------------------------------------------  OUT:    Total OUT: 0 ml  ---------------------------------------------  Total NET: 3000 ml

## 2017-05-21 VITALS
OXYGEN SATURATION: 95 % | RESPIRATION RATE: 20 BRPM | SYSTOLIC BLOOD PRESSURE: 146 MMHG | HEART RATE: 107 BPM | TEMPERATURE: 98 F | DIASTOLIC BLOOD PRESSURE: 80 MMHG

## 2017-05-21 LAB
ANION GAP SERPL CALC-SCNC: 7 MMOL/L — SIGNIFICANT CHANGE UP (ref 5–17)
BUN SERPL-MCNC: 24 MG/DL — HIGH (ref 7–23)
CALCIUM SERPL-MCNC: 8.8 MG/DL — SIGNIFICANT CHANGE UP (ref 8.4–10.5)
CHLORIDE SERPL-SCNC: 102 MMOL/L — SIGNIFICANT CHANGE UP (ref 96–108)
CK SERPL-CCNC: 788 U/L — HIGH (ref 39–308)
CO2 SERPL-SCNC: 27 MMOL/L — SIGNIFICANT CHANGE UP (ref 22–31)
CREAT SERPL-MCNC: 1.16 MG/DL — SIGNIFICANT CHANGE UP (ref 0.5–1.3)
GLUCOSE SERPL-MCNC: 89 MG/DL — SIGNIFICANT CHANGE UP (ref 70–99)
POTASSIUM SERPL-MCNC: 3.8 MMOL/L — SIGNIFICANT CHANGE UP (ref 3.5–5.3)
POTASSIUM SERPL-SCNC: 3.8 MMOL/L — SIGNIFICANT CHANGE UP (ref 3.5–5.3)
SODIUM SERPL-SCNC: 136 MMOL/L — SIGNIFICANT CHANGE UP (ref 135–145)

## 2017-05-21 PROCEDURE — 99239 HOSP IP/OBS DSCHRG MGMT >30: CPT

## 2017-05-21 RX ORDER — ARIPIPRAZOLE 15 MG/1
1 TABLET ORAL
Qty: 0 | Refills: 0 | COMMUNITY
Start: 2017-05-21

## 2017-05-21 RX ORDER — DULOXETINE HYDROCHLORIDE 30 MG/1
1 CAPSULE, DELAYED RELEASE ORAL
Qty: 0 | Refills: 0 | COMMUNITY
Start: 2017-05-21

## 2017-05-21 RX ORDER — AMLODIPINE BESYLATE 2.5 MG/1
1 TABLET ORAL
Qty: 30 | Refills: 0 | OUTPATIENT
Start: 2017-05-21 | End: 2017-06-20

## 2017-05-21 RX ORDER — CLONAZEPAM 1 MG
1 TABLET ORAL
Qty: 10 | Refills: 0 | OUTPATIENT
Start: 2017-05-21 | End: 2017-05-26

## 2017-05-21 RX ORDER — DIAZEPAM 5 MG
5 TABLET ORAL ONCE
Qty: 0 | Refills: 0 | Status: DISCONTINUED | OUTPATIENT
Start: 2017-05-21 | End: 2017-05-21

## 2017-05-21 RX ADMIN — Medication 5 MILLIGRAM(S): at 02:28

## 2017-05-21 RX ADMIN — Medication 1 MILLIGRAM(S): at 11:18

## 2017-05-21 RX ADMIN — DULOXETINE HYDROCHLORIDE 60 MILLIGRAM(S): 30 CAPSULE, DELAYED RELEASE ORAL at 11:18

## 2017-05-21 RX ADMIN — HEPARIN SODIUM 5000 UNIT(S): 5000 INJECTION INTRAVENOUS; SUBCUTANEOUS at 05:25

## 2017-05-21 RX ADMIN — Medication 15 MILLIGRAM(S): at 05:25

## 2017-05-21 RX ADMIN — Medication 1 MILLIGRAM(S): at 09:15

## 2017-05-21 RX ADMIN — Medication 15 MILLIGRAM(S): at 13:49

## 2017-05-21 RX ADMIN — ARIPIPRAZOLE 10 MILLIGRAM(S): 15 TABLET ORAL at 11:18

## 2017-05-21 RX ADMIN — HEPARIN SODIUM 5000 UNIT(S): 5000 INJECTION INTRAVENOUS; SUBCUTANEOUS at 13:50

## 2017-05-21 RX ADMIN — SODIUM CHLORIDE 75 MILLILITER(S): 9 INJECTION INTRAMUSCULAR; INTRAVENOUS; SUBCUTANEOUS at 09:20

## 2017-05-21 NOTE — DISCHARGE NOTE ADULT - CARE PROVIDERS DIRECT ADDRESSES
,DirectAddress_Unknown ,DirectAddress_Unknown,[email or web address]

## 2017-05-21 NOTE — DISCHARGE NOTE ADULT - MEDICATION SUMMARY - MEDICATIONS TO TAKE
I will START or STAY ON the medications listed below when I get home from the hospital:    naproxen 500 mg oral tablet  -- 1 tab(s) by mouth 2 times a day  -- Indication: For bodyaches    lisinopril 40 mg oral tablet  -- 1 tab(s) by mouth once a day  -- Indication: For Hypertension    clonazePAM 1 mg oral tablet  -- 1 tab(s) by mouth 2 times a day, As Needed -for anxiety MDD:2  -- Caution federal law prohibits the transfer of this drug to any person other  than the person for whom it was prescribed.  Do not drink alcoholic beverages when taking this medication.  Do not take this drug if you are pregnant.  It is very important that you take or use this exactly as directed.  Do not skip doses or discontinue unless directed by your doctor.  May cause drowsiness.  Alcohol may intensify this effect.  Use care when operating dangerous machinery.  Obtain medical advice before taking any non-prescription drugs as some may affect the action of this medication.  This drug may impair the ability to drive or operate machinery.  Use care until you become familiar with its effects.    -- Indication: For Anxiety    DULoxetine 60 mg oral delayed release capsule  -- 1 cap(s) by mouth once a day  -- Indication: For Fibromyalgia    ARIPiprazole 10 mg oral tablet  -- 1 tab(s) by mouth once a day  -- Indication: For Anxiety    busPIRone 15 mg oral tablet  -- 1 tab(s) by mouth 3 times a day  -- Indication: For Anxiety    hydroCHLOROthiazide 12.5 mg oral capsule  -- 1 cap(s) by mouth once a day  -- Indication: For Hypertension    Vitamin D3 5000 intl units oral capsule  -- 1 cap(s) by mouth once a day  -- Indication: For vitamin    folic acid 1 mg oral tablet  -- 1 tab(s) by mouth once a day  -- Indication: For vitamin

## 2017-05-21 NOTE — DISCHARGE NOTE ADULT - CARE PLAN
Principal Discharge DX:	YURY (acute kidney injury)  Goal:	continue hydration, follow up with primary  MD to check renal funciton  Secondary Diagnosis:	Chest pain, unspecified type  Goal:	take pain meds as needed; follow up with primary MD  Secondary Diagnosis:	Essential hypertension  Goal:	continue Lisinopril, hydrochlorothiazide and monitor BP and follow up with primary MD  Secondary Diagnosis:	Fibromyalgia Principal Discharge DX:	YURY (acute kidney injury)  Goal:	continue hydration, follow up with primary  MD to check renal funciton. Also follow up with Kidney doctor (nephrologist) Dr Dasilva  Instructions for follow-up, activity and diet:	take more fluids; follow up with primary MD - Dr Salmeron and kidney doctor (nephrologist) - Dr Gage  Secondary Diagnosis:	Chest pain, unspecified type  Goal:	follow up with primary MD; avoid naproxen - advised by nephrologist Dr Gage  Secondary Diagnosis:	Essential hypertension  Goal:	continue Lisinopril, hydrochlorothiazide and monitor BP and follow up with primary MD  Secondary Diagnosis:	Fibromyalgia

## 2017-05-21 NOTE — DISCHARGE NOTE ADULT - ADDITIONAL INSTRUCTIONS
Patient was explained in detail that the nephrologist has advised to stop taking Hydrochlorothiazide, lisinopril and naproxen in view of worsening of kidney function and to start Amlodipine 5 mg daily for hypertension. and to  follow up with primary MD - Dr Menezes within a few days.  Above plan for discharge was discussed with patient, his father and nurse taking care of patient

## 2017-05-21 NOTE — DISCHARGE NOTE ADULT - PLAN OF CARE
continue hydration, follow up with primary  MD to check renal funciton take pain meds as needed; follow up with primary MD continue Lisinopril, hydrochlorothiazide and monitor BP and follow up with primary MD take more fluids; follow up with primary MD - Dr Salmeron and kidney doctor (nephrologist) - Dr Gage continue hydration, follow up with primary  MD to check renal funciton. Also follow up with Kidney doctor (nephrologist) Dr Dasilva follow up with primary MD; avoid naproxen - advised by nephrologist Dr Gage

## 2017-05-21 NOTE — DISCHARGE NOTE ADULT - HOSPITAL COURSE
Patient was admitted with diffucse muscle spasms and chest pain. Labs showed presence of acute kidney injury. Patient was given IV fluids with improvement of acute kidney injury. Patient was evaluated by nephrology (Dr Merari Long).     Problem/Plan - 1:  ·  Problem: Chest pain, unspecified type.  Plan: unlikely related to CAD - acute coronary syndrome ruled out  trend troponins  tachycardia should improve with hydration.     Problem/Plan - 2:  ·  Problem: YURY (acute kidney injury).  Plan: - hydrate and repeat BMP in AM.  - creatinine trending down.     Problem/Plan - 3:  ·  Problem: Uncomplicated asthma, unspecified asthma severity.  Plan: - continue home medications     Problem/Plan - 4:  ·  Problem: Fibromyalgia.  Plan: - cont abilify and buspar and cymbalta (monitor QTc)  - outpt psych consult   - cont klonopin.     Problem/Plan - 5:  ·  Problem: Essential hypertension.  Plan: - will be on lopressor IV PRN. 32M with fibromyalgia, HTN, depression, asthma, IBS who presents with diffuse muscle spasms.  Patient states his spams feels similar to his fibromyalgia but much worse.  He has been experiencing spasms throughout his body for the past three days.  In addition, he started to experience chest pain, right-side worse than his left.  Pain is described as intermittent but each episode has been lasting longer and longer.  Also associated with +SOB, +cough, +nausea/vomiting (no blood), +diarrhea (no blood), +decreased PO intake, +HA/dizziness (for the past 2 days).  Admits to some recent weight loss as well.  Patient denies any recent travel or sick contacts.  No recent medication changes except he has been taking Prilosec instead of his other GI medication.  Also has been taking his inhaler (xopenex) more frequently because of his SOB.  Also smoked marijuana about 4 days ago though he said the marijuana was from the same batch he has smoked before with no adverse reactions.    Labs showed presence of acute kidney injury. Patient was given IV fluids with improvement of acute kidney injury. Patient was evaluated by nephrology (Dr Merari Long).     Problem/Plan - 1:  ·  Problem: Chest pain, unspecified type.  Plan: unlikely related to CAD - acute coronary syndrome ruled out;  Naproxen stopped as advised by nephrologist Dr Gage. Do not take naproxen as outpatient.    Problem/Plan - 2:  ·  Problem: YURY (acute kidney injury).  - improved with hydration - follow up with primary MD    Problem/Plan - 3:  ·  Problem: Uncomplicated asthma, unspecified asthma severity.  Plan: - continue home medications     Problem/Plan - 4:  ·  Problem: Fibromyalgia.  Plan: - cont abilify and buspar and cymbalta - follow up with primary MD - Dr Menezes      Problem/Plan - 5:  ·  Problem: Essential hypertension.  - Hydrochlorothiazide and Lisinopril are stopped as advised by nephrologist. Amlodipine started 5 mg. Monitor BP and follow up with primary MD - Dr Menezes    Called Dr Menezes's office - spoke to answering  Bernie and left message for Dr Menezes - patient is being discharged today and he has been advised to call the office and follow up with Dr Menezes as outpatient

## 2017-05-21 NOTE — DISCHARGE NOTE ADULT - OTHER SIGNIFICANT FINDINGS
PHYSICAL EXAM:      Constitutional: well built, resting in bed comfortably, not in distress    Eyes: pupils reac    ENMT:    Neck:    Breasts:    Back:    Respiratory:    Cardiovascular:    Gastrointestinal:    Genitourinary:    Rectal:    Extremities:    Vascular:    Neurological:    Skin:    Lymph Nodes:    Musculoskeletal:    Psychiatric: PHYSICAL EXAM:      Constitutional: well built, resting in bed comfortably, not in distress    Eyes: pupils reactive    ENMT: Normal    Neck: supple, normal    Back: Normal    Respiratory: Bilateral air entry present, clear    Cardiovascular: S1 +, S2+, regular heart sounds,     Gastrointestinal: soft, bowel sounds +, nontender    Extremities: No edema    Vascular: normal    Neurological: awake, alert and oriented x 3; no focal deficits    Skin: Normal    Lymph Nodes: Normal    Musculoskeletal: Normal    Psychiatric: No anxiety, no agitation.

## 2017-05-21 NOTE — DISCHARGE NOTE ADULT - PATIENT PORTAL LINK FT
“You can access the [facility] Patient Portal, offered by [facility], by registering with the following website: [email or web address]”

## 2017-05-21 NOTE — DISCHARGE NOTE ADULT - CARE PROVIDER_API CALL
Julien Menezes (DO), Family Medicine  [address] [place] [address]  Phone: [phone]  Fax: [phone] Julien Menezes (), Family Medicine  [address] [place] [address]  Phone: [phone]  Fax: [phone]    Mercedes Gage), Nephrology  [address] [id number]  Phone: [phone]  Fax: [phone]

## 2017-05-25 LAB
CULTURE RESULTS: SIGNIFICANT CHANGE UP
CULTURE RESULTS: SIGNIFICANT CHANGE UP
SPECIMEN SOURCE: SIGNIFICANT CHANGE UP
SPECIMEN SOURCE: SIGNIFICANT CHANGE UP

## 2017-07-13 RX ORDER — LISINOPRIL 2.5 MG/1
1 TABLET ORAL
Qty: 0 | Refills: 0 | COMMUNITY

## 2017-07-13 RX ORDER — CLONAZEPAM 1 MG
1 TABLET ORAL
Qty: 0 | Refills: 0 | COMMUNITY

## 2017-07-13 RX ORDER — DULOXETINE HYDROCHLORIDE 30 MG/1
1 CAPSULE, DELAYED RELEASE ORAL
Qty: 0 | Refills: 0 | COMMUNITY

## 2017-07-13 RX ORDER — ARIPIPRAZOLE 15 MG/1
1 TABLET ORAL
Qty: 0 | Refills: 0 | COMMUNITY

## 2017-07-13 RX ORDER — CHOLECALCIFEROL (VITAMIN D3) 125 MCG
1 CAPSULE ORAL
Qty: 0 | Refills: 0 | COMMUNITY

## 2017-07-13 RX ORDER — FOLIC ACID 0.8 MG
1 TABLET ORAL
Qty: 0 | Refills: 0 | COMMUNITY

## 2017-12-18 PROCEDURE — 85379 FIBRIN DEGRADATION QUANT: CPT

## 2017-12-18 PROCEDURE — 80307 DRUG TEST PRSMV CHEM ANLYZR: CPT

## 2017-12-18 PROCEDURE — 82553 CREATINE MB FRACTION: CPT

## 2017-12-18 PROCEDURE — 76775 US EXAM ABDO BACK WALL LIM: CPT

## 2017-12-18 PROCEDURE — 96376 TX/PRO/DX INJ SAME DRUG ADON: CPT

## 2017-12-18 PROCEDURE — 83605 ASSAY OF LACTIC ACID: CPT

## 2017-12-18 PROCEDURE — 80048 BASIC METABOLIC PNL TOTAL CA: CPT

## 2017-12-18 PROCEDURE — 36415 COLL VENOUS BLD VENIPUNCTURE: CPT

## 2017-12-18 PROCEDURE — 81001 URINALYSIS AUTO W/SCOPE: CPT

## 2017-12-18 PROCEDURE — 80053 COMPREHEN METABOLIC PANEL: CPT

## 2017-12-18 PROCEDURE — 83690 ASSAY OF LIPASE: CPT

## 2017-12-18 PROCEDURE — 84443 ASSAY THYROID STIM HORMONE: CPT

## 2017-12-18 PROCEDURE — 74176 CT ABD & PELVIS W/O CONTRAST: CPT

## 2017-12-18 PROCEDURE — 85027 COMPLETE CBC AUTOMATED: CPT

## 2017-12-18 PROCEDURE — 84100 ASSAY OF PHOSPHORUS: CPT

## 2017-12-18 PROCEDURE — 84484 ASSAY OF TROPONIN QUANT: CPT

## 2017-12-18 PROCEDURE — 82550 ASSAY OF CK (CPK): CPT

## 2017-12-18 PROCEDURE — 96375 TX/PRO/DX INJ NEW DRUG ADDON: CPT

## 2017-12-18 PROCEDURE — 87086 URINE CULTURE/COLONY COUNT: CPT

## 2017-12-18 PROCEDURE — 83735 ASSAY OF MAGNESIUM: CPT

## 2017-12-18 PROCEDURE — 93005 ELECTROCARDIOGRAM TRACING: CPT

## 2017-12-18 PROCEDURE — 96374 THER/PROPH/DIAG INJ IV PUSH: CPT

## 2017-12-18 PROCEDURE — 71045 X-RAY EXAM CHEST 1 VIEW: CPT

## 2017-12-18 PROCEDURE — 99285 EMERGENCY DEPT VISIT HI MDM: CPT | Mod: 25

## 2017-12-18 PROCEDURE — 87040 BLOOD CULTURE FOR BACTERIA: CPT

## 2021-09-30 NOTE — ED PROVIDER NOTE - TEMPLATE, MLM
Biopsychosocial and Barriers Assessment    Cancer Diagnosis:  Lung  Home/Cell Phone:   [phone]  Emergency Contact:  Ekta Nicholsoner- Daughter  Marital Status:   Interpretation concerns, speaks another language, preferred language: English  Cultural concerns: N/A  Ability to read or write: Yes    Caregiver/Support: Yes, wife Mary Stovall: 2 1 son and 1 Daughter  Child/Elder care: N/A    Housing: Lives in his own home  Home Setup: no issues  Lives With: spouse  Daily Living Activities:  Durable Medical Equipment: walker, 02, hospital bed, BSC  Ambulation:  Does well with walker    Preferred Pharmacy: [facility]  High co-pays with insurance: N/A  High co-pays with medication coverage:N/A  No medication coverage: N/A    Primary Care Provider: Dr Benitez Reza  Hx of [address]: Yes, [facility]  Hx of Short term rehab: Pt was in the [facility] [address] Hx: N/A   Substance Abuse Hx: N/A  Employment: Retired  [address] Status/Location:N/A  Ability to pay bills: Yes  POA/LW/AD: No  Transportation Plan/Concerns: Family drives and MSW completed a [employer] application      What do you know about your Cancer Diagnosis    What has your doctor told you about your cancer diagnosis:  Pt was able to speak clearly about his lung cancer  What has your doctor told you about your cancer treatment:  Pt is aware that he will need chemo and radiation  What specific concerns do you have about your diagnosis and treatment:      Have you been made aware of any hair loss associated with treatment: N/A      Additional Comments:  MSW met with the pt and his wife and son in the radiation department  The pt presents as realistic and understanding of his diagnosis and treatment plan  The family had concerns about transportation for his radiation treatment and MSW reviewed the [employer]  The application and was reviewed and signed    The pt's son states they will decide, as a family, if they are not able to bring the pt they will use the service  MSW requested they give this MSW 48 hours to have the pt placed on the schedule  They verbalized understanding  Contact information was provided  MSW will continue to follow  Cardiac